# Patient Record
Sex: MALE
[De-identification: names, ages, dates, MRNs, and addresses within clinical notes are randomized per-mention and may not be internally consistent; named-entity substitution may affect disease eponyms.]

---

## 2018-01-30 PROBLEM — Z00.00 ENCOUNTER FOR PREVENTIVE HEALTH EXAMINATION: Status: ACTIVE | Noted: 2018-01-30

## 2018-01-31 ENCOUNTER — APPOINTMENT (OUTPATIENT)
Dept: OTOLARYNGOLOGY | Facility: CLINIC | Age: 38
End: 2018-01-31
Payer: COMMERCIAL

## 2018-01-31 VITALS
WEIGHT: 180 LBS | TEMPERATURE: 98.3 F | HEIGHT: 70 IN | HEART RATE: 66 BPM | DIASTOLIC BLOOD PRESSURE: 88 MMHG | BODY MASS INDEX: 25.77 KG/M2 | SYSTOLIC BLOOD PRESSURE: 134 MMHG

## 2018-01-31 DIAGNOSIS — C49.9 MALIGNANT NEOPLASM OF CONNECTIVE AND SOFT TISSUE, UNSPECIFIED: ICD-10-CM

## 2018-01-31 PROCEDURE — 99204 OFFICE O/P NEW MOD 45 MIN: CPT

## 2018-02-07 ENCOUNTER — APPOINTMENT (OUTPATIENT)
Dept: MRI IMAGING | Facility: CLINIC | Age: 38
End: 2018-02-07

## 2018-04-11 ENCOUNTER — FORM ENCOUNTER (OUTPATIENT)
Age: 38
End: 2018-04-11

## 2018-04-11 ENCOUNTER — OTHER (OUTPATIENT)
Age: 38
End: 2018-04-11

## 2018-04-11 ENCOUNTER — CHART COPY (OUTPATIENT)
Age: 38
End: 2018-04-11

## 2018-04-12 ENCOUNTER — APPOINTMENT (OUTPATIENT)
Dept: MRI IMAGING | Facility: HOSPITAL | Age: 38
End: 2018-04-12

## 2018-04-12 ENCOUNTER — OUTPATIENT (OUTPATIENT)
Dept: OUTPATIENT SERVICES | Facility: HOSPITAL | Age: 38
LOS: 1 days | End: 2018-04-12
Payer: COMMERCIAL

## 2018-04-12 PROCEDURE — 70543 MRI ORBT/FAC/NCK W/O &W/DYE: CPT

## 2018-04-12 PROCEDURE — A9585: CPT

## 2018-04-12 PROCEDURE — 70543 MRI ORBT/FAC/NCK W/O &W/DYE: CPT | Mod: 26

## 2021-02-08 ENCOUNTER — NON-APPOINTMENT (OUTPATIENT)
Age: 41
End: 2021-02-08

## 2021-03-08 ENCOUNTER — APPOINTMENT (OUTPATIENT)
Dept: PULMONOLOGY | Facility: HOSPITAL | Age: 41
End: 2021-03-08
Payer: COMMERCIAL

## 2021-03-08 VITALS
SYSTOLIC BLOOD PRESSURE: 120 MMHG | HEIGHT: 70 IN | DIASTOLIC BLOOD PRESSURE: 70 MMHG | HEART RATE: 68 BPM | BODY MASS INDEX: 25.77 KG/M2 | WEIGHT: 180 LBS

## 2021-03-08 PROCEDURE — 99245 OFF/OP CONSLTJ NEW/EST HI 55: CPT

## 2021-03-08 RX ORDER — ARIPIPRAZOLE 2 MG/1
2 TABLET ORAL
Qty: 14 | Refills: 0 | Status: ACTIVE | COMMUNITY
Start: 2021-03-05

## 2021-03-08 RX ORDER — ASPIRIN 81 MG/1
81 TABLET, COATED ORAL
Qty: 30 | Refills: 0 | Status: ACTIVE | COMMUNITY
Start: 2020-10-23

## 2021-03-08 RX ORDER — TICAGRELOR 90 MG/1
90 TABLET ORAL
Qty: 60 | Refills: 0 | Status: ACTIVE | COMMUNITY
Start: 2021-02-22

## 2021-03-08 RX ORDER — ARIPIPRAZOLE 5 MG/1
5 TABLET ORAL
Qty: 90 | Refills: 0 | Status: ACTIVE | COMMUNITY
Start: 2021-02-08

## 2021-03-08 RX ORDER — ATORVASTATIN CALCIUM 80 MG/1
80 TABLET, FILM COATED ORAL
Qty: 30 | Refills: 0 | Status: ACTIVE | COMMUNITY
Start: 2020-11-17

## 2021-03-08 NOTE — PHYSICAL EXAM
[Heart Sounds] : normal S1 and S2 [Murmurs] : no murmurs [] : no respiratory distress [Auscultation Breath Sounds / Voice Sounds] : lungs were clear to auscultation bilaterally [No Focal Deficits] : no focal deficits [Oriented To Time, Place, And Person] : oriented to person, place, and time [FreeTextEntry1] : no edema

## 2021-03-08 NOTE — HISTORY OF PRESENT ILLNESS
[FreeTextEntry1] : Sandhya Scott\par Dr. Cronin\par Dr. Rodriguez ([facility])\par 40 year old man  with history of cad s.p stent, v tach is here in the sleep center to address Narcolepsy. Patient had ventricular tachycardia during his myocardial infarction episode, which resolved and is currently not on any medication for it.   Patient is sleepy with [place] sleepiness score of 19. Patient uses oral appliance for mild sleep apnea.   Patient has  snoring which improved with oral appliance, does not have any witnessed apneas.  Patient's bedtime is around 11 PM wakes up in the morning around 6 AM. His sleep is disturbed, wakes up few times at night.\par He has rotational schedule at work (). He does 24 hr calls twice a week.  He feels tired when he wakes up.  Patient drinks 3-6 cups of coffee during the daytime. Today i suggested him to stop drinking coffee.  patient does not have any headaches or nocturia.  He is sleepy while driving.\par \par Due to  above symptoms he underwent PSG/MSLT which showed mild sleep apnea and Narcolepsy. His sleeplatency is 2.2 minutes and has 3 SOREMS.\par Discussed about medication choices and side effects for narcolepsy.\par \par Given history of ventricular tachycardia, best starting medication will be xyrem/xywav. If xyrem does not work will talk to cardiologist and do stimulant therapy or wakix.\par \par \par

## 2021-03-08 NOTE — ASSESSMENT
[FreeTextEntry1] : 40-year-old man with narcolepsy and mild obstructive sleep apnea.\par \par Patient is currently using oral appliance for mild sleep apnea, will switch to CPAP for mild obstructive sleep apnea in the next visit.\par \par Discussed in detail about narcolepsy and treatment options.  Will start patient on Xywav and see how he responds.  Due to the recent ventricular tachycardia that happened I think xywav would be the best initial treatment.\par \par I will discuss further with the cardiologist and see if we can add stimulants to his regimen, we may have to add some stimulants during the day as he is not able to use Xywav couple nights a week due to his overnight work schedule as a .\par \par We discussed side effects of xywav and about narcolepsy today in detail with the patient.

## 2021-03-08 NOTE — REVIEW OF SYSTEMS
[EDS: ESS=____] : daytime somnolence: ESS=[unfilled] [Fatigue] : fatigue [Snoring] : snoring [Negative] : Psychiatric

## 2021-04-06 ENCOUNTER — APPOINTMENT (OUTPATIENT)
Dept: PULMONOLOGY | Facility: HOSPITAL | Age: 41
End: 2021-04-06
Payer: COMMERCIAL

## 2021-04-06 VITALS — BODY MASS INDEX: 27.2 KG/M2 | HEIGHT: 70 IN | WEIGHT: 190 LBS

## 2021-04-06 DIAGNOSIS — G47.19 OTHER HYPERSOMNIA: ICD-10-CM

## 2021-04-06 PROCEDURE — 99214 OFFICE O/P EST MOD 30 MIN: CPT | Mod: 95

## 2021-04-06 NOTE — REASON FOR VISIT
[Home] : at home, [unfilled] , at the time of the visit. [Medical Office: ([facility])___] : at the medical office located in  [Verbal consent obtained from patient] : the patient, [unfilled] [Follow-Up] : a follow-up visit [FreeTextEntry1] : narcolepsy and sleep apnea

## 2021-04-06 NOTE — HISTORY OF PRESENT ILLNESS
[FreeTextEntry1] : Sandhya Scott\par Dr. Cronin\par Dr. Rodriguez ([facility])\par Dr. Ruiz\par 41 year old man with history of cad s.p stent, v tach is here in the sleep center to address Narcolepsy. Patient had ventricular tachycardia during his myocardial infarction episode, which resolved and is currently not on any medication for it.   Patient is sleepy with [place] sleepiness score of 19, with xyrem his ess is 17. Patient uses oral appliance for mild sleep apnea.   Patient has  snoring even with oral appliance will give cpap.  Patient's bedtime is around 11 PM wakes up in the morning around 6 AM. His sleep improved with xyrem at night.\par \par He has rotational schedule at work (). He does 24 hr calls twice a week.  He feels tired when he wakes up.  \par He cannot take xyrem.  Patient drinks 3-6 cups of coffee during the daytime. Today i suggested him to stop drinking coffee.  patient does not have any headaches or nocturia.  He is sleepy while driving.\par \par Due to  above symptoms he underwent PSG/MSLT which showed mild sleep apnea and Narcolepsy. His sleeplatency is 2.2 minutes and has 3 SOREMS.\par Discussed about medication choices and side effects for narcolepsy.\par \par Given history of ventricular tachycardia, best starting medication will be xyrem. Still has elevated ess on xyrem, will increase dose to 4.5 g twice at night.\par \par \par

## 2021-04-26 ENCOUNTER — NON-APPOINTMENT (OUTPATIENT)
Age: 41
End: 2021-04-26

## 2021-05-17 ENCOUNTER — APPOINTMENT (OUTPATIENT)
Dept: PULMONOLOGY | Facility: HOSPITAL | Age: 41
End: 2021-05-17
Payer: COMMERCIAL

## 2021-05-17 VITALS
BODY MASS INDEX: 28.63 KG/M2 | WEIGHT: 200 LBS | HEART RATE: 49 BPM | DIASTOLIC BLOOD PRESSURE: 63 MMHG | HEIGHT: 70 IN | SYSTOLIC BLOOD PRESSURE: 97 MMHG

## 2021-05-17 PROCEDURE — 99214 OFFICE O/P EST MOD 30 MIN: CPT

## 2021-05-17 NOTE — HISTORY OF PRESENT ILLNESS
[FreeTextEntry1] : Sandhya Scott\par Dr. Cronin\par Dr. Rodriguez ([facility])\par Dr. Ruiz\par 41 year old man with history of cad s.p stent, v tach is here in the sleep center to address Narcolepsy. Patient had ventricular tachycardia during his myocardial infarction episode, which resolved and is currently not on any medication for it.   Patient is sleepy with [place] sleepiness score of 17, with xyrem and wakix (17.8). Patient uses oral appliance for mild sleep apnea.   Patient has  snoring even with oral appliance will give cpap.  Patient's bedtime is around 11 PM wakes up in the morning around 6 AM. His sleep improved with xyrem at night.\par \par He has rotational schedule at work (). He does 24 hr calls twice a week.  He feels tired when he wakes up.  \par He cannot take xyrem those days that he is working.  Patient does not drink any caffeinated drinks.   patient does not have any headaches or nocturia.  He is sleepy while driving.\par \par Due to  above symptoms he underwent PSG/MSLT which showed mild sleep apnea and Narcolepsy. His sleeplatency is 2.2 minutes and has 3 SOREMS.\par \par Will continue xyrem 4 g twice and increase wakix to 34.9.\par \par \par

## 2021-05-17 NOTE — ASSESSMENT
[FreeTextEntry1] : 41-year-old man with narcolepsy and mild obstructive sleep apnea.\par \par Patient remains sleepy with [place] sleepiness score of 17 even with Xyrem  at 8 g at night and Wakix 17.8 mg.\par \par I asked him to increase the Wakix during the day to 34.9 and continue the Xyrem at the same dose.\par \par We also add CPAP for mild obstructive sleep apnea as oral appliance does not seem to be working.  Patient will start using CPAP with a nasal mask and will reevaluate in few weeks time.

## 2021-07-30 ENCOUNTER — APPOINTMENT (OUTPATIENT)
Dept: PULMONOLOGY | Facility: CLINIC | Age: 41
End: 2021-07-30
Payer: COMMERCIAL

## 2021-07-30 VITALS
SYSTOLIC BLOOD PRESSURE: 110 MMHG | HEART RATE: 65 BPM | BODY MASS INDEX: 27.49 KG/M2 | DIASTOLIC BLOOD PRESSURE: 70 MMHG | WEIGHT: 192 LBS | HEIGHT: 70 IN

## 2021-07-30 DIAGNOSIS — G47.411 NARCOLEPSY WITH CATAPLEXY: ICD-10-CM

## 2021-07-30 PROCEDURE — 99215 OFFICE O/P EST HI 40 MIN: CPT

## 2021-07-30 RX ORDER — PITOLISANT HYDROCHLORIDE 17.8 MG/1
17.8 TABLET, FILM COATED ORAL
Qty: 23 | Refills: 0 | Status: DISCONTINUED | COMMUNITY
Start: 2021-05-03 | End: 2021-07-30

## 2021-07-30 RX ORDER — MODAFINIL 200 MG/1
200 TABLET ORAL
Qty: 60 | Refills: 0 | Status: DISCONTINUED | COMMUNITY
Start: 2021-05-25 | End: 2021-07-30

## 2021-07-30 NOTE — ASSESSMENT
[FreeTextEntry1] : 41-year-old man with narcolepsy and cataplexy doing well with Xyrem at night and Vyvanse in the morning.  We will give him a Vyvanse 40 mg for a month and see if the dose is enough.\par \par Mild sleep apnea is doing well with the CPAP, there is no snoring.  There are some central apneas noted with the CPAP machine, today we change it to a standard setting of 8 cm and will reevaluate in 2 months time.  Patient is compliant and benefited significantly with the CPAP.

## 2021-07-30 NOTE — HISTORY OF PRESENT ILLNESS
[FreeTextEntry1] : Sandhya Soctt\par Dr. Rodriguez ([facility])\par Dr. Ruiz\par 41 year old man with history of cad s.p stent, v tach is here in the sleep center to address Narcolepsy. Patient had ventricular tachycardia during his myocardial infarction episode, which resolved and is currently not on any medication for it.   Patient is sleepy with [place] sleepiness score of 17, with xyrem and wakix (17.8).      Patient's bedtime is around 11 PM wakes up in the morning around 6 AM. His sleep improved with xyrem at night.\par \par He has rotational schedule at work (). He does 24 hr calls twice a week.  He feels tired when he wakes up.  \par He cannot take xyrem those days that he is working.   patient does not have any headaches or nocturia.  He is not sleepy while driving.\par \par Due to  above symptoms he underwent PSG/MSLT which showed mild sleep apnea and Narcolepsy. His sleeplatency is 2.2 minutes and has 3 SOREMS.\par \par Will continue xyrem 4 g twice and vyvanse 40 mg\par \par he failed provigil and wakix.\par \par download from cpap\par ahi 12 (central apneas)\par pressure 8 cm\par usage 6 hrs\par dme [employer]ria\par uses nasal mask\par on resmed device\par \par \par

## 2021-07-30 NOTE — PHYSICAL EXAM
[General Appearance - Well Developed] : well developed [General Appearance - Well Nourished] : well nourished [III] : III [Heart Sounds] : normal S1 and S2 [Murmurs] : no murmurs [] : no respiratory distress [Auscultation Breath Sounds / Voice Sounds] : lungs were clear to auscultation bilaterally [No Focal Deficits] : no focal deficits [Oriented To Time, Place, And Person] : oriented to person, place, and time [Memory Recent] : recent memory was not impaired [FreeTextEntry1] : no edema

## 2021-09-30 RX ORDER — LISDEXAMFETAMINE DIMESYLATE 30 MG/1
30 CAPSULE ORAL DAILY
Qty: 15 | Refills: 0 | Status: DISCONTINUED | COMMUNITY
Start: 2021-07-27 | End: 2021-09-30

## 2021-09-30 RX ORDER — LISDEXAMFETAMINE DIMESYLATE 40 MG/1
40 CAPSULE ORAL
Qty: 30 | Refills: 0 | Status: DISCONTINUED | COMMUNITY
Start: 2021-07-30 | End: 2021-09-30

## 2021-10-04 ENCOUNTER — APPOINTMENT (OUTPATIENT)
Dept: PULMONOLOGY | Facility: CLINIC | Age: 41
End: 2021-10-04
Payer: COMMERCIAL

## 2021-10-04 VITALS — WEIGHT: 192 LBS | BODY MASS INDEX: 27.49 KG/M2 | HEIGHT: 70 IN

## 2021-10-04 DIAGNOSIS — G47.33 OBSTRUCTIVE SLEEP APNEA (ADULT) (PEDIATRIC): ICD-10-CM

## 2021-10-04 DIAGNOSIS — G47.419 NARCOLEPSY W/OUT CATAPLEXY: ICD-10-CM

## 2021-10-04 PROCEDURE — 99213 OFFICE O/P EST LOW 20 MIN: CPT | Mod: 95

## 2021-10-04 RX ORDER — SODIUM OXYBATE 0.5 G/ML
500 SOLUTION ORAL
Qty: 540 | Refills: 0 | Status: DISCONTINUED | COMMUNITY
Start: 2021-03-08 | End: 2021-10-04

## 2021-10-04 NOTE — ASSESSMENT
[FreeTextEntry1] : 41-year-old man with narcolepsy and cataplexy doing well with Xyrem at night and Vyvanse 50mg in the morning.  \par \par Mild sleep apnea is doing well with the CPAP, there is no snoring.  Patient is compliant and benefited significantly with the CPAP.

## 2021-10-04 NOTE — HISTORY OF PRESENT ILLNESS
[FreeTextEntry1] : Sandhya Scott\par Dr. Rodriguez ([facility])\par Dr. Ruiz\par 41 year old man with history of cad s.p stent, v tach is here in the sleep center to address Narcolepsy. Patient had ventricular tachycardia during his myocardial infarction episode, which resolved and is currently not on any medication for it.   Patient is not sleepy with [place] sleepiness score of 5 with the current regimen xyrem 4 gm twice at night and vyvanse 50 mg. \par \par Patient failed combination of  xyrem and wakix (17.8).      Patient's bedtime is around 11 PM wakes up in the morning around 6 AM. His sleep improved with xyrem at night.\par \par He has rotational schedule at work (). He does 24 hr calls twice a week.  Those 2 days he is not taking xyrem and depends only on vyvanse in the morning.\par  patient does not have any headaches or nocturia.  He is not sleepy while driving.\par \par Due to  above symptoms he underwent PSG/MSLT which showed mild sleep apnea and Narcolepsy. His sleeplatency is 2.2 minutes and has 3 SOREMS.\par \par Will continue xyrem 4 g twice and vyvanse 50 mg\par \par he failed provigil and wakix.\par \par download from cpap\par ahi 0.6\par pressure 8 cm\par usage 4 hrs\par dme [employer]\par uses nasal mask\par on resmed device\par \par \par

## 2021-12-06 RX ORDER — LISDEXAMFETAMINE DIMESYLATE 50 MG/1
50 CAPSULE ORAL
Qty: 30 | Refills: 0 | Status: DISCONTINUED | COMMUNITY
Start: 2021-09-03 | End: 2021-12-06

## 2022-03-07 RX ORDER — LISDEXAMFETAMINE DIMESYLATE 60 MG/1
60 CAPSULE ORAL
Qty: 30 | Refills: 0 | Status: ACTIVE | COMMUNITY
Start: 2021-12-06 | End: 1900-01-01

## 2022-04-08 RX ORDER — SODIUM OXYBATE 0.5 G/ML
500 SOLUTION ORAL
Qty: 540 | Refills: 5 | Status: ACTIVE | COMMUNITY
Start: 2021-04-06 | End: 1900-01-01

## 2022-07-19 PROBLEM — G47.419 NARCOLEPSY WITHOUT CATAPLEXY: Status: ACTIVE | Noted: 2021-03-08
